# Patient Record
Sex: FEMALE | Race: BLACK OR AFRICAN AMERICAN | NOT HISPANIC OR LATINO | Employment: OTHER | URBAN - METROPOLITAN AREA
[De-identification: names, ages, dates, MRNs, and addresses within clinical notes are randomized per-mention and may not be internally consistent; named-entity substitution may affect disease eponyms.]

---

## 2024-05-28 PROBLEM — M19.93 SECONDARY OSTEOARTHRITIS: Status: ACTIVE | Noted: 2024-05-28

## 2024-05-28 PROBLEM — M85.80 OSTEOPENIA: Status: ACTIVE | Noted: 2024-05-28

## 2024-05-28 PROBLEM — M17.0 PRIMARY OSTEOARTHRITIS OF BOTH KNEES: Status: ACTIVE | Noted: 2024-05-28

## 2024-05-28 PROBLEM — Z79.899 HIGH RISK MEDICATION USE: Status: ACTIVE | Noted: 2024-05-28

## 2024-05-28 PROBLEM — M06.09 RHEUMATOID ARTHRITIS OF MULTIPLE SITES WITH NEGATIVE RHEUMATOID FACTOR: Status: ACTIVE | Noted: 2024-05-28

## 2024-05-29 PROBLEM — M06.00 SERONEGATIVE RHEUMATOID ARTHRITIS: Status: ACTIVE | Noted: 2024-05-29

## 2024-05-30 NOTE — PROGRESS NOTES
Office Follow Up      Date: 06/04/2024   Patient Name: Lelo Diana  MRN: [id number]  YOB: 1950    Referring Physician: Ashley Correa MD     Chief Complaint: Rheumatoid arthritis      History of Present Illness: Lelo Diana is a 74 y.o. female who is here today for follow up on rheumatoid arthritis.  She had left total knee replacement with Dr. Cedeno in the interim.  This was performed in January 2024.  She had very good results and has excellent range of motion without pain.  She did well with PT.  RA is doing well.  No joint swelling. No issues with MTX. No recent infections.  She remains on 5 mg of prednisone chronically.  Pain scale: 4/10. The problem has not changed. The primary symptoms reported include: pain and stiffness. The following symptoms are not reported:  swelling and functional limitation. The patient assesses the interval disease activity as: stable. The patient's assessment of treatment is: helping greatly. The patient is not experiencing side effects. The locations affected since last visit are low back and left knee. Associated symptoms include edema. Pertinent negatives include fever, infection, fatigue, AM stiffness, inactivity gelling, change in vision, sicca complaints, mouth sores/lesions, skin lesion(s), chest pain, changing cough, joint swelling and abdominal pain      Subjective   Review of Systems: Review of Systems   Constitutional:  Negative for chills, fatigue, fever and unexpected weight loss.   HENT:  Negative for dental problem, mouth sores, sinus pressure and swollen glands.    Eyes:  Negative for photophobia, pain and redness.   Respiratory:  Negative for apnea, cough, chest tightness and shortness of breath.    Cardiovascular:  Negative for chest pain and leg swelling.   Gastrointestinal:  Negative for abdominal pain, diarrhea, nausea and GERD.   Genitourinary:  Negative for dysuria and hematuria.   Skin:  Negative for dry skin,  rash, skin lesions and bruise.   Neurological:  Negative for dizziness, seizures, syncope, weakness, headache and memory problem.   Hematological:  Negative for adenopathy. Does not bruise/bleed easily.   Psychiatric/Behavioral:  Negative for sleep disturbance, suicidal ideas, depressed mood and stress. The patient is not nervous/anxious.         Medications:   Current Outpatient Medications:     atorvastatin (LIPITOR) 20 MG tablet, Take 1 tablet by mouth Daily., Disp: , Rfl:     Calcium Carbonate-Vitamin D (CALTRATE 600+D PO), Take 1 tablet by mouth Daily., Disp: , Rfl:     escitalopram (LEXAPRO) 10 MG tablet, Take 1 tablet by mouth Daily., Disp: , Rfl:     folic acid (FOLVITE) 1 MG tablet, Take 1 tablet by mouth See Admin Instructions. every day except the day of methotrexate., Disp: 90 tablet, Rfl: 1    Garlic Oil 1000 MG capsule, Take 1 capsule by mouth Daily., Disp: , Rfl:     hydroCHLOROthiazide (MICROZIDE) 12.5 MG capsule, Take 1 capsule by mouth Every Other Day., Disp: , Rfl:     meclizine (ANTIVERT) 25 MG tablet, Take 1 tablet by mouth Daily As Needed., Disp: , Rfl:     methotrexate 2.5 MG tablet, Take 5 tablets by mouth 1 (One) Time Per Week. TAKE 5 TABLETS EVERY WEEK, Disp: 60 tablet, Rfl: 0    Omega-3 Fatty Acids (fish oil) 1000 MG capsule capsule, Take 1 capsule by mouth Daily., Disp: , Rfl:     predniSONE (DELTASONE) 5 MG tablet, Take 1 tablet by mouth Every Morning., Disp: 90 tablet, Rfl: 1    telmisartan (MICARDIS) 80 MG tablet, Take 1 tablet by mouth Daily., Disp: , Rfl:     vitamin D3 125 MCG (5000 UT) capsule capsule, Take  by mouth Daily., Disp: , Rfl:     amLODIPine (NORVASC) 5 MG tablet, Take 2 tablets by mouth Daily. (Patient not taking: Reported on 6/4/2024), Disp: , Rfl:     Allergies:   Allergies   Allergen Reactions    Clonidine Derivatives Unknown - High Severity    Codeine Unknown - High Severity    Erythromycin Base Unknown - High Severity    Latex Unknown - High Severity    Sulfa  "Antibiotics Unknown - High Severity       I have reviewed and updated the patient's chief complaint, history of present illness, review of systems, past medical history, surgical history, family history, social history, medications and allergy list as appropriate.     Objective    Vital Signs:   Vitals:    06/04/24 0858   BP: 140/82   BP Location: Left arm   Patient Position: Sitting   Cuff Size: Adult   Pulse: 94   Temp: 97.6 °F (36.4 °C)   TempSrc: Temporal   Weight: 93.6 kg (206 lb 4.8 oz)   Height: 170.2 cm (67\")   PainSc:   8   PainLoc: Generalized     Body mass index is 32.31 kg/m².    Physical Exam:  GENERAL: The patient is well developed and well nourished.  Cooperative and oriented times three.  Affect is normal.  Hydration appears normal.    HEENT: Normocephalic and atraumatic.  No notable alopecia.  No facial rash.  Lids and conjunctiva are normal.  Pupils are equal and sclera are clear.   NECK: Neck is supple without adenopathy, masses, or thyromegaly.    LUNGS: Effort is normal.  Lungs are clear without rales or rhonchi.    CARDIOVASCULAR: Normal S1, S2.  No murmurs are heard.   ABDOMEN: Soft and nontender without HSM.   EXTREMITIES: There is 1-2+ edema.   I see no cyanosis or clubbing.    SKIN: Inspection and palpation are normal.  No rashes are present.    NEUROLOGIC: Gait is normal.  MUSCULOSKELETAL: Complete joint exam was performed.  There is full range of motion of the shoulders, elbows, wrists, and hands without, soft tissue swelling, synovitis, or atrophy. Deformities are present in the hands and wrists. Nodule vs ganglion cyst on extensor tendon of L thumb. There is full extension and full flexion of the knees without pain. L knee has valgus deformity. Crepitus bilaterally knees. Left knee without effusion, warmth, erythema. Ankles have no soft tissue swelling, synovitis, but deformities are present, fairly severe valgus changes.    BACK: Straight without notable scoliosis  Joint Exam " 06/04/2024     The following joints were examined and normal:   Left Glenohumeral, Right Glenohumeral, Left Elbow, Right Elbow, Left Wrist, Right Wrist, Left MCP 1, Right MCP 1, Left MCP 2, Right MCP 2, Left MCP 3, Right MCP 3, Left MCP 4, Right MCP 4, Left MCP 5, Right MCP 5, Left IP (thumb), Right IP (thumb), Left PIP 2 (finger), Right PIP 2 (finger), Left PIP 3 (finger), Right PIP 3 (finger), Left PIP 4 (finger), Right PIP 4 (finger), Left PIP 5 (finger), Right PIP 5 (finger), Left Knee, Right Knee       Patient Global: 6 mm  Provider Global: 1 mm    Assessment / Plan      Assessment & Plan  Rheumatoid arthritis of multiple sites with negative rheumatoid factor  ONSET 1983. GOLD UNTIL 1990. MTX AND PREDNISONE 1990. PLAQUENIL ADDED 2000. STOPPED PLAQUENIL 2019..    Doing well.  Had left total knee replacement in the interim and did very well.  Tender joint count is 0, swollen joint count is 0, physician global is 1, visual analog pain scale is 4, pt global is 6  CDAI is 7-low disease activity.  Prognosis is good  Continue MTX.   Continue prednisone 5 mg daily.  Continue q 6 week labs and q 3 month monitoring visits  Secondary osteoarthritis  Diffuse.  High risk medication use  Methotrexate and Plaquenil therapies.  Well-tolerated and effective.  She knows the need for every 6 to 8-week labs and q. year ocular exams.  Primary osteoarthritis of both knees  Left total knee replacement 1/18/2024.  She had good results.  Right knee is doing well.    Osteopenia, unspecified location  Normal BMD on 5/17 DEXA.  12/27/21 DEXA with left femoral neck -1.2 and spine -0.8. Low frax score.  Encourage Vitamin D and Calcium supplements.  Weight bearing exercises.  Repeat DEXA on or after 12/27/23  Orders Placed This Encounter   Procedures    CBC Auto Differential    Comprehensive Metabolic Panel    C-reactive Protein    Sedimentation Rate     New Medications Ordered This Visit   Medications    folic acid (FOLVITE) 1 MG tablet      Sig: Take 1 tablet by mouth See Admin Instructions. every day except the day of methotrexate.     Dispense:  90 tablet     Refill:  1    methotrexate 2.5 MG tablet     Sig: Take 5 tablets by mouth 1 (One) Time Per Week. TAKE 5 TABLETS EVERY WEEK     Dispense:  60 tablet     Refill:  0    predniSONE (DELTASONE) 5 MG tablet     Sig: Take 1 tablet by mouth Every Morning.     Dispense:  90 tablet     Refill:  1          Follow Up:   Return in about 3 months (around 9/4/2024).        Jimmy Pang MD  [facility]

## 2024-05-30 NOTE — ASSESSMENT & PLAN NOTE
ONSET 1983. GOLD UNTIL 1990. MTX AND PREDNISONE 1990. PLAQUENIL ADDED 2000. STOPPED PLAQUENIL 2019..    Doing well.  Had left total knee replacement in the interim and did very well.  Tender joint count is 0, swollen joint count is 0, physician global is 1, visual analog pain scale is 4, pt global is 6  CDAI is 7-low disease activity.  Prognosis is good  Continue MTX.   Continue prednisone 5 mg daily.  Continue q 6 week labs and q 3 month monitoring visits

## 2024-05-30 NOTE — ASSESSMENT & PLAN NOTE
Methotrexate and Plaquenil therapies.  Well-tolerated and effective.  She knows the need for every 6 to 8-week labs and q. year ocular exams.

## 2024-06-04 ENCOUNTER — LAB (OUTPATIENT)
Facility: HOSPITAL | Age: 74
End: 2024-06-04
Payer: MEDICARE

## 2024-06-04 ENCOUNTER — OFFICE VISIT (OUTPATIENT)
Age: 74
End: 2024-06-04
Payer: MEDICARE

## 2024-06-04 VITALS
TEMPERATURE: 97.6 F | DIASTOLIC BLOOD PRESSURE: 82 MMHG | HEART RATE: 94 BPM | SYSTOLIC BLOOD PRESSURE: 140 MMHG | WEIGHT: 206.3 LBS | BODY MASS INDEX: 32.38 KG/M2 | HEIGHT: 67 IN

## 2024-06-04 DIAGNOSIS — Z79.899 HIGH RISK MEDICATION USE: ICD-10-CM

## 2024-06-04 DIAGNOSIS — M06.09 RHEUMATOID ARTHRITIS OF MULTIPLE SITES WITH NEGATIVE RHEUMATOID FACTOR: ICD-10-CM

## 2024-06-04 DIAGNOSIS — M85.80 OSTEOPENIA, UNSPECIFIED LOCATION: ICD-10-CM

## 2024-06-04 DIAGNOSIS — M19.93 SECONDARY OSTEOARTHRITIS: ICD-10-CM

## 2024-06-04 DIAGNOSIS — M17.0 PRIMARY OSTEOARTHRITIS OF BOTH KNEES: ICD-10-CM

## 2024-06-04 DIAGNOSIS — M06.09 RHEUMATOID ARTHRITIS OF MULTIPLE SITES WITH NEGATIVE RHEUMATOID FACTOR: Primary | ICD-10-CM

## 2024-06-04 LAB
ALBUMIN SERPL-MCNC: 4 G/DL (ref 3.5–5.2)
ALBUMIN/GLOB SERPL: 1.3 G/DL
ALP SERPL-CCNC: 78 U/L (ref 39–117)
ALT SERPL W P-5'-P-CCNC: 12 U/L (ref 1–33)
ANION GAP SERPL CALCULATED.3IONS-SCNC: 9.8 MMOL/L (ref 5–15)
AST SERPL-CCNC: 14 U/L (ref 1–32)
BASOPHILS # BLD AUTO: 0.05 10*3/MM3 (ref 0–0.2)
BASOPHILS NFR BLD AUTO: 0.8 % (ref 0–1.5)
BILIRUB SERPL-MCNC: 0.7 MG/DL (ref 0–1.2)
BUN SERPL-MCNC: 18 MG/DL (ref 8–23)
BUN/CREAT SERPL: 17.6 (ref 7–25)
CALCIUM SPEC-SCNC: 10.9 MG/DL (ref 8.6–10.5)
CHLORIDE SERPL-SCNC: 105 MMOL/L (ref 98–107)
CO2 SERPL-SCNC: 27.2 MMOL/L (ref 22–29)
CREAT SERPL-MCNC: 1.02 MG/DL (ref 0.57–1)
CRP SERPL-MCNC: 1.61 MG/DL (ref 0–0.5)
DEPRECATED RDW RBC AUTO: 50.1 FL (ref 37–54)
EGFRCR SERPLBLD CKD-EPI 2021: 58.2 ML/MIN/1.73
EOSINOPHIL # BLD AUTO: 0.2 10*3/MM3 (ref 0–0.4)
EOSINOPHIL NFR BLD AUTO: 3.2 % (ref 0.3–6.2)
ERYTHROCYTE [DISTWIDTH] IN BLOOD BY AUTOMATED COUNT: 17.1 % (ref 12.3–15.4)
ERYTHROCYTE [SEDIMENTATION RATE] IN BLOOD: 34 MM/HR (ref 0–30)
GLOBULIN UR ELPH-MCNC: 3.2 GM/DL
GLUCOSE SERPL-MCNC: 95 MG/DL (ref 65–99)
HCT VFR BLD AUTO: 42.4 % (ref 34–46.6)
HGB BLD-MCNC: 12.9 G/DL (ref 12–15.9)
IMM GRANULOCYTES # BLD AUTO: 0.02 10*3/MM3 (ref 0–0.05)
IMM GRANULOCYTES NFR BLD AUTO: 0.3 % (ref 0–0.5)
LYMPHOCYTES # BLD AUTO: 1.23 10*3/MM3 (ref 0.7–3.1)
LYMPHOCYTES NFR BLD AUTO: 19.9 % (ref 19.6–45.3)
MCH RBC QN AUTO: 25.3 PG (ref 26.6–33)
MCHC RBC AUTO-ENTMCNC: 30.4 G/DL (ref 31.5–35.7)
MCV RBC AUTO: 83.3 FL (ref 79–97)
MONOCYTES # BLD AUTO: 0.12 10*3/MM3 (ref 0.1–0.9)
MONOCYTES NFR BLD AUTO: 1.9 % (ref 5–12)
NEUTROPHILS NFR BLD AUTO: 4.57 10*3/MM3 (ref 1.7–7)
NEUTROPHILS NFR BLD AUTO: 73.9 % (ref 42.7–76)
NRBC BLD AUTO-RTO: 0 /100 WBC (ref 0–0.2)
PLATELET # BLD AUTO: 236 10*3/MM3 (ref 140–450)
PMV BLD AUTO: 10.4 FL (ref 6–12)
POTASSIUM SERPL-SCNC: 4.2 MMOL/L (ref 3.5–5.2)
PROT SERPL-MCNC: 7.2 G/DL (ref 6–8.5)
RBC # BLD AUTO: 5.09 10*6/MM3 (ref 3.77–5.28)
SODIUM SERPL-SCNC: 142 MMOL/L (ref 136–145)
WBC NRBC COR # BLD AUTO: 6.19 10*3/MM3 (ref 3.4–10.8)

## 2024-06-04 PROCEDURE — 36415 COLL VENOUS BLD VENIPUNCTURE: CPT

## 2024-06-04 PROCEDURE — 80053 COMPREHEN METABOLIC PANEL: CPT

## 2024-06-04 PROCEDURE — 99214 OFFICE O/P EST MOD 30 MIN: CPT | Performed by: INTERNAL MEDICINE

## 2024-06-04 PROCEDURE — 85025 COMPLETE CBC W/AUTO DIFF WBC: CPT

## 2024-06-04 PROCEDURE — 1160F RVW MEDS BY RX/DR IN RCRD: CPT | Performed by: INTERNAL MEDICINE

## 2024-06-04 PROCEDURE — 85652 RBC SED RATE AUTOMATED: CPT

## 2024-06-04 PROCEDURE — 86140 C-REACTIVE PROTEIN: CPT

## 2024-06-04 PROCEDURE — 1159F MED LIST DOCD IN RCRD: CPT | Performed by: INTERNAL MEDICINE

## 2024-06-04 RX ORDER — PREDNISONE 5 MG/1
5 TABLET ORAL EVERY MORNING
Qty: 90 TABLET | Refills: 1 | Status: SHIPPED | OUTPATIENT
Start: 2024-06-04

## 2024-06-04 RX ORDER — METHOTREXATE 2.5 MG/1
12.5 TABLET ORAL WEEKLY
Qty: 60 TABLET | Refills: 0 | Status: SHIPPED | OUTPATIENT
Start: 2024-06-04

## 2024-06-04 RX ORDER — FOLIC ACID 1 MG/1
1 TABLET ORAL SEE ADMIN INSTRUCTIONS
Qty: 90 TABLET | Refills: 1 | Status: SHIPPED | OUTPATIENT
Start: 2024-06-04

## 2024-06-05 ENCOUNTER — TELEPHONE (OUTPATIENT)
Age: 74
End: 2024-06-05
Payer: MEDICARE